# Patient Record
Sex: FEMALE | Race: WHITE | Employment: UNEMPLOYED | URBAN - METROPOLITAN AREA
[De-identification: names, ages, dates, MRNs, and addresses within clinical notes are randomized per-mention and may not be internally consistent; named-entity substitution may affect disease eponyms.]

---

## 2019-02-11 ENCOUNTER — HOSPITAL ENCOUNTER (EMERGENCY)
Age: 44
Discharge: HOME | End: 2019-02-11
Attending: EMERGENCY MEDICINE
Payer: COMMERCIAL

## 2019-02-11 VITALS
OXYGEN SATURATION: 99 % | WEIGHT: 132.5 LBS | HEART RATE: 96 BPM | DIASTOLIC BLOOD PRESSURE: 87 MMHG | HEIGHT: 63 IN | RESPIRATION RATE: 14 BRPM | TEMPERATURE: 97.8 F | SYSTOLIC BLOOD PRESSURE: 157 MMHG | BODY MASS INDEX: 23.48 KG/M2

## 2019-02-11 DIAGNOSIS — D64.9 ANEMIA, UNSPECIFIED TYPE: ICD-10-CM

## 2019-02-11 DIAGNOSIS — N93.8 DUB (DYSFUNCTIONAL UTERINE BLEEDING): Primary | ICD-10-CM

## 2019-02-11 LAB
ALBUMIN SERPL-MCNC: 3.9 G/DL (ref 3.5–5)
ALBUMIN/GLOB SERPL: 1.1 {RATIO} (ref 1.1–2.2)
ALP SERPL-CCNC: 60 U/L (ref 45–117)
ALT SERPL-CCNC: 26 U/L (ref 12–78)
ANION GAP SERPL CALC-SCNC: 6 MMOL/L (ref 5–15)
APPEARANCE UR: CLEAR
AST SERPL-CCNC: 22 U/L (ref 15–37)
BACTERIA URNS QL MICRO: NEGATIVE /HPF
BILIRUB SERPL-MCNC: 0.3 MG/DL (ref 0.2–1)
BILIRUB UR QL: NEGATIVE
BUN SERPL-MCNC: 14 MG/DL (ref 6–20)
BUN/CREAT SERPL: 19 (ref 12–20)
CALCIUM SERPL-MCNC: 8.8 MG/DL (ref 8.5–10.1)
CHLORIDE SERPL-SCNC: 102 MMOL/L (ref 97–108)
CO2 SERPL-SCNC: 25 MMOL/L (ref 21–32)
COLOR UR: ABNORMAL
CREAT SERPL-MCNC: 0.72 MG/DL (ref 0.55–1.02)
EPITH CASTS URNS QL MICRO: ABNORMAL /LPF
ERYTHROCYTE [DISTWIDTH] IN BLOOD BY AUTOMATED COUNT: 13.7 % (ref 11.5–14.5)
GLOBULIN SER CALC-MCNC: 3.6 G/DL (ref 2–4)
GLUCOSE SERPL-MCNC: 110 MG/DL (ref 65–100)
GLUCOSE UR STRIP.AUTO-MCNC: NEGATIVE MG/DL
HCG SERPL QL: NEGATIVE
HCT VFR BLD AUTO: 32.4 % (ref 35–47)
HGB BLD-MCNC: 10.8 G/DL (ref 11.5–16)
HGB UR QL STRIP: ABNORMAL
HYALINE CASTS URNS QL MICRO: ABNORMAL /LPF (ref 0–5)
KETONES UR QL STRIP.AUTO: NEGATIVE MG/DL
LEUKOCYTE ESTERASE UR QL STRIP.AUTO: NEGATIVE
MCH RBC QN AUTO: 29.8 PG (ref 26–34)
MCHC RBC AUTO-ENTMCNC: 33.3 G/DL (ref 30–36.5)
MCV RBC AUTO: 89.3 FL (ref 80–99)
NITRITE UR QL STRIP.AUTO: NEGATIVE
NRBC # BLD: 0 K/UL (ref 0–0.01)
NRBC BLD-RTO: 0 PER 100 WBC
PH UR STRIP: 8 [PH] (ref 5–8)
PLATELET # BLD AUTO: 306 K/UL (ref 150–400)
PMV BLD AUTO: 9.7 FL (ref 8.9–12.9)
POTASSIUM SERPL-SCNC: 3.7 MMOL/L (ref 3.5–5.1)
PROT SERPL-MCNC: 7.5 G/DL (ref 6.4–8.2)
PROT UR STRIP-MCNC: NEGATIVE MG/DL
RBC # BLD AUTO: 3.63 M/UL (ref 3.8–5.2)
RBC #/AREA URNS HPF: >100 /HPF (ref 0–5)
SODIUM SERPL-SCNC: 133 MMOL/L (ref 136–145)
SP GR UR REFRACTOMETRY: 1.01 (ref 1–1.03)
TSH SERPL DL<=0.05 MIU/L-ACNC: 0.37 UIU/ML (ref 0.36–3.74)
UROBILINOGEN UR QL STRIP.AUTO: 0.2 EU/DL (ref 0.2–1)
WBC # BLD AUTO: 8.7 K/UL (ref 3.6–11)
WBC URNS QL MICRO: ABNORMAL /HPF (ref 0–4)

## 2019-02-11 PROCEDURE — 81001 URINALYSIS AUTO W/SCOPE: CPT

## 2019-02-11 PROCEDURE — 36415 COLL VENOUS BLD VENIPUNCTURE: CPT

## 2019-02-11 PROCEDURE — 74011250636 HC RX REV CODE- 250/636: Performed by: EMERGENCY MEDICINE

## 2019-02-11 PROCEDURE — 85027 COMPLETE CBC AUTOMATED: CPT

## 2019-02-11 PROCEDURE — 99282 EMERGENCY DEPT VISIT SF MDM: CPT

## 2019-02-11 PROCEDURE — 84703 CHORIONIC GONADOTROPIN ASSAY: CPT

## 2019-02-11 PROCEDURE — 96360 HYDRATION IV INFUSION INIT: CPT

## 2019-02-11 PROCEDURE — 84443 ASSAY THYROID STIM HORMONE: CPT

## 2019-02-11 PROCEDURE — 80053 COMPREHEN METABOLIC PANEL: CPT

## 2019-02-11 RX ORDER — MEDROXYPROGESTERONE ACETATE 10 MG/1
10 TABLET ORAL DAILY
Qty: 5 TAB | Refills: 0 | Status: SHIPPED | OUTPATIENT
Start: 2019-02-11 | End: 2019-02-16

## 2019-02-11 RX ORDER — LIOTHYRONINE SODIUM 5 UG/1
5 TABLET ORAL DAILY
COMMUNITY
End: 2019-03-20

## 2019-02-11 RX ADMIN — SODIUM CHLORIDE 1000 ML: 900 INJECTION, SOLUTION INTRAVENOUS at 20:39

## 2019-02-12 NOTE — ED PROVIDER NOTES
EMERGENCY DEPARTMENT HISTORY AND PHYSICAL EXAM 
 
 
Date: 2/11/2019 Patient Name: Louise Hoover History of Presenting Illness Chief Complaint Patient presents with  Vaginal Bleeding Pt reports vaginal bleeding x 9 days. Pt states her periods have been irregular since November History Provided By: Patient and Patient's  HPI: Louise Hoover, 37 y.o. female with PMHx significant for thyroid disease, presents ambulatory to the ED with cc of continued vaginal bleeding x 9 days. She reports associated symptoms of generalized weakness, lightheadedness, fatigue, nausea, and abdominal distension. Pt states she has been experiencing irregular menstrual cycles, noting her menses in October was the last regular cycle. She had not had a menses in November, but in December, she states her menses was regular then with the exception that it had lasted a couple days longer. About 2 weeks ago, she states her menses was late and had then been spotting until February 2nd, which it had became heavy. Since then, her menses had continued and notes she had been experienced \"lots of clots. \" She informs of prior issues with vaginal bleeding. When asked about any abdominal pain, the pt states she had experienced intermittent L sided abdominal pain, but notes this is of no concern to her currently. She denies any prior major surgeries. Pt denies any vomiting. There are no other complaints, changes, or physical findings at this time. PCP: Kalee Marshall MD 
 
No current facility-administered medications on file prior to encounter. Current Outpatient Medications on File Prior to Encounter Medication Sig Dispense Refill  liothyronine (CYTOMEL) 5 mcg tablet Take 5 mcg by mouth daily.  amitriptyline (ELAVIL) 50 mg tablet TAKE 1 TABLET NIGHTLY 90 Tab 2  
 levothyroxine (SYNTHROID) 50 mcg tablet Take 1 Tab by mouth Daily (before breakfast). 90 Tab 4  clonazePAM (KLONOPIN) 1 mg tablet Take 0.25 mg by mouth as needed.  multivitamins-minerals-lutein (CENTRUM SILVER) Tab Take  by mouth. Past History Past Medical History: 
Past Medical History:  
Diagnosis Date  Thyroid condition Past Surgical History: 
Past Surgical History:  
Procedure Laterality Date  HX CYST REMOVAL Family History: 
Family History Problem Relation Age of Onset  Heart Disease Mother  Hypertension Mother  Hypertension Father Social History: 
Social History Tobacco Use  Smoking status: Never Smoker  Smokeless tobacco: Never Used Substance Use Topics  Alcohol use: No  
 Drug use: No  
 
 
Allergies: Allergies Allergen Reactions  Pcn [Penicillins] Hives Review of Systems Review of Systems Constitutional: Positive for fatigue. Negative for chills and fever. HENT: Negative for congestion, rhinorrhea, sneezing and sore throat. Respiratory: Negative for shortness of breath. Cardiovascular: Negative for chest pain. Gastrointestinal: Positive for abdominal distention, abdominal pain and nausea. Negative for vomiting. Musculoskeletal: Negative for back pain, myalgias and neck stiffness. Skin: Negative for rash. Neurological: Positive for weakness (+generalized) and light-headedness. Negative for dizziness and headaches. All other systems reviewed and are negative. Physical Exam  
Physical Exam  
Constitutional: She is oriented to person, place, and time. She appears well-developed and well-nourished. No distress. HENT:  
Head: Normocephalic. Mouth/Throat: Oropharynx is clear and moist.  
Eyes: EOM are normal.  
Neck: Normal range of motion. Neck supple. Cardiovascular: Normal rate, regular rhythm, normal heart sounds and intact distal pulses. Pulmonary/Chest: Effort normal and breath sounds normal. No respiratory distress. Abdominal: Soft. Bowel sounds are normal. There is tenderness. There is no rebound. Mild lower abdominal tenderness Musculoskeletal: Normal range of motion. She exhibits no edema. Neurological: She is alert and oriented to person, place, and time. Skin: Skin is warm and dry. Psychiatric: She has a normal mood and affect. Diagnostic Study Results Labs - Recent Results (from the past 12 hour(s)) URINALYSIS W/ RFLX MICROSCOPIC Collection Time: 02/11/19  6:30 PM  
Result Value Ref Range Color YELLOW/STRAW Appearance CLEAR CLEAR Specific gravity 1.011 1.003 - 1.030    
 pH (UA) 8.0 5.0 - 8.0 Protein NEGATIVE  NEG mg/dL Glucose NEGATIVE  NEG mg/dL Ketone NEGATIVE  NEG mg/dL Bilirubin NEGATIVE  NEG Blood LARGE (A) NEG Urobilinogen 0.2 0.2 - 1.0 EU/dL Nitrites NEGATIVE  NEG Leukocyte Esterase NEGATIVE  NEG    
 WBC 0-4 0 - 4 /hpf  
 RBC >100 (H) 0 - 5 /hpf Epithelial cells FEW FEW /lpf Bacteria NEGATIVE  NEG /hpf Hyaline cast 0-2 0 - 5 /lpf  
CBC W/O DIFF Collection Time: 02/11/19  6:33 PM  
Result Value Ref Range WBC 8.7 3.6 - 11.0 K/uL  
 RBC 3.63 (L) 3.80 - 5.20 M/uL  
 HGB 10.8 (L) 11.5 - 16.0 g/dL HCT 32.4 (L) 35.0 - 47.0 % MCV 89.3 80.0 - 99.0 FL  
 MCH 29.8 26.0 - 34.0 PG  
 MCHC 33.3 30.0 - 36.5 g/dL  
 RDW 13.7 11.5 - 14.5 % PLATELET 922 878 - 529 K/uL MPV 9.7 8.9 - 12.9 FL  
 NRBC 0.0 0  WBC ABSOLUTE NRBC 0.00 0.00 - 0.01 K/uL METABOLIC PANEL, COMPREHENSIVE Collection Time: 02/11/19  6:33 PM  
Result Value Ref Range Sodium 133 (L) 136 - 145 mmol/L Potassium 3.7 3.5 - 5.1 mmol/L Chloride 102 97 - 108 mmol/L  
 CO2 25 21 - 32 mmol/L Anion gap 6 5 - 15 mmol/L Glucose 110 (H) 65 - 100 mg/dL BUN 14 6 - 20 MG/DL Creatinine 0.72 0.55 - 1.02 MG/DL  
 BUN/Creatinine ratio 19 12 - 20 GFR est AA >60 >60 ml/min/1.73m2 GFR est non-AA >60 >60 ml/min/1.73m2  Calcium 8.8 8.5 - 10.1 MG/DL  
 Bilirubin, total 0.3 0.2 - 1.0 MG/DL  
 ALT (SGPT) 26 12 - 78 U/L  
 AST (SGOT) 22 15 - 37 U/L Alk. phosphatase 60 45 - 117 U/L Protein, total 7.5 6.4 - 8.2 g/dL Albumin 3.9 3.5 - 5.0 g/dL Globulin 3.6 2.0 - 4.0 g/dL A-G Ratio 1.1 1.1 - 2.2 HCG QL SERUM Collection Time: 02/11/19  6:33 PM  
Result Value Ref Range HCG, Ql. NEGATIVE  NEG    
TSH 3RD GENERATION Collection Time: 02/11/19  6:33 PM  
Result Value Ref Range TSH 0.37 0.36 - 3.74 uIU/mL Radiologic Studies - No orders to display Medical Decision Making I am the first provider for this patient. I reviewed the vital signs, available nursing notes, past medical history, past surgical history, family history and social history. Vital Signs-Reviewed the patient's vital signs. Patient Vitals for the past 12 hrs: 
 Temp Pulse Resp BP SpO2  
02/11/19 1813 97.8 °F (36.6 °C) 96 14 157/87 99 % Records Reviewed: Nursing Notes, Old Medical Records, Previous Radiology Studies and Previous Laboratory Studies Provider Notes (Medical Decision Making): DDx: hypothyroidism, symptomatic anemia, dysfunctional uterine bleeding, UTI, thyroid dysfunction ED Course:  
Initial assessment performed. The patients presenting problems have been discussed, and they are in agreement with the care plan formulated and outlined with them. I have encouraged them to ask questions as they arise throughout their visit. PROGRESS NOTE: 
9:35 PM 
Pt has been re-evaluated. Will start the pt on iron supplementation. Will have the pt start Provera and have her f/u with OB/GYN. Critical Care Time:  
0 Disposition: 
DISCHARGE NOTE 
9:37 PM 
The patient has been re-evaluated and is ready for discharge. Reviewed available results with patient. Counseled patient on diagnosis and care plan. Patient has expressed understanding, and all questions have been answered.  Patient agrees with plan and agrees to follow up as recommended, or return to the ED if their symptoms worsen. Discharge instructions have been provided and explained to the patient, along with reasons to return to the ED. PLAN: 
1. Discharge Current Discharge Medication List  
  
START taking these medications Details  
medroxyPROGESTERone (PROVERA) 10 mg tablet Take 1 Tab by mouth daily for 5 days. Qty: 5 Tab, Refills: 0  
  
  
 
2. Follow-up Information Follow up With Specialties Details Why Contact Info Danay Goldstein MD Obstetrics & Gynecology, Gynecology, Obstetrics Schedule an appointment as soon as possible for a visit  [facility] Lesly England [address] [place] [id number] [phone] Return to ED if worse Diagnosis Clinical Impression: 1. DUB (dysfunctional uterine bleeding) 2. Anemia, unspecified type Attestations: This note is prepared by Keshia Mora, acting as Scribe for Bradley Jasso. MD Raine. Bradley Jasso. MD Raine: The scribe's documentation has been prepared under my direction and personally reviewed by me in its entirety. I confirm that the note above accurately reflects all work, treatment, procedures, and medical decision making performed by me. This note will not be viewable in [address]

## 2019-02-12 NOTE — ED NOTES
8:39 PM 
Patient presents with complaints of feeling weak and heavy vaginal bleeding. Patient states that she is going through 1 pad every 4 hours. Patient is also complaining of abdominal distention.

## 2019-02-12 NOTE — DISCHARGE INSTRUCTIONS
Patient Education        Abnormal Uterine Bleeding: Care Instructions  Your Care Instructions    Abnormal uterine bleeding (AUB) is irregular bleeding from the uterus that is longer or heavier than usual or does not occur at your regular time. Sometimes it is caused by changes in hormone levels. It can also be caused by growths in the uterus, such as fibroids or polyps. Sometimes a cause cannot be found. You may have heavy bleeding when you are not expecting your period. Your doctor may suggest a pregnancy test, if you think you are pregnant. Follow-up care is a key part of your treatment and safety. Be sure to make and go to all appointments, and call your doctor if you are having problems. It's also a good idea to know your test results and keep a list of the medicines you take. How can you care for yourself at home? · Be safe with medicines. Take pain medicines exactly as directed. ? If the doctor gave you a prescription medicine for pain, take it as prescribed. ? If you are not taking a prescription pain medicine, ask your doctor if you can take an over-the-counter medicine. · You may be low in iron because of blood loss. Eat a balanced diet that is high in iron and vitamin C. Foods rich in iron include red meat, shellfish, eggs, beans, and leafy green vegetables. Talk to your doctor about whether you need to take iron pills or a multivitamin. When should you call for help? Call 911 anytime you think you may need emergency care. For example, call if:    · You passed out (lost consciousness).    Call your doctor now or seek immediate medical care if:    · You have new or worse belly or pelvic pain.     · You have severe vaginal bleeding.     · You feel dizzy or lightheaded, or you feel like you may faint.    Watch closely for changes in your health, and be sure to contact your doctor if:    · You think you may be pregnant.     · Your bleeding gets worse.     · You do not get better as expected.    Where can you learn more? Go to http://lupis-roderick.info/. Enter E728 in the search box to learn more about \"Abnormal Uterine Bleeding: Care Instructions. \"  Current as of: May 14, 2018  Content Version: 11.9  © 0946-4289 [employer]. Care instructions adapted under license by [employer] (which disclaims liability or warranty for this information). If you have questions about a medical condition or this instruction, always ask your healthcare professional. [facility] any warranty or liability for your use of this information. Patient Education        Anemia: Care Instructions  Your Care Instructions    Anemia is a low level of red blood cells, which carry oxygen throughout your body. Many things can cause anemia. Lack of iron is one of the most common causes. Your body needs iron to make hemoglobin, a substance in red blood cells that carries oxygen from the lungs to your body's cells. Without enough iron, the body produces fewer and smaller red blood cells. As a result, your body's cells do not get enough oxygen, and you feel tired and weak. And you may have trouble concentrating. Bleeding is the most common cause of a lack of iron. You may have heavy menstrual bleeding or bleeding caused by conditions such as ulcers, hemorrhoids, or cancer. Regular use of aspirin or other anti-inflammatory medicines (such as ibuprofen) also can cause bleeding in some people. A lack of iron in your diet also can cause anemia, especially at times when the body needs more iron, such as during pregnancy, infancy, and the teen years. Your doctor may have prescribed iron pills. It may take several months of treatment for your iron levels to return to normal. Your doctor also may suggest that you eat foods that are rich in iron, such as meat and beans. There are many other causes of anemia. It is not always due to a lack of iron.  Finding the specific cause of your anemia will help your doctor find the right treatment for you. Follow-up care is a key part of your treatment and safety. Be sure to make and go to all appointments, and call your doctor if you are having problems. It's also a good idea to know your test results and keep a list of the medicines you take. How can you care for yourself at home? · Take your medicines exactly as prescribed. Call your doctor if you think you are having a problem with your medicine. · If your doctor recommends iron pills, take them as directed:  ? Try to take the pills on an empty stomach about 1 hour before or 2 hours after meals. But you may need to take iron with food to avoid an upset stomach. ? Do not take antacids or drink milk or caffeine drinks (such as coffee, tea, or cola) at the same time or within 2 hours of the time that you take your iron. They can make it hard for your body to absorb the iron. ? Vitamin C (from food or supplements) helps your body absorb iron. Try taking iron pills with a glass of orange juice or some other food that is high in vitamin C, such as citrus fruits. ? Iron pills may cause stomach problems, such as heartburn, nausea, diarrhea, constipation, and cramps. Be sure to drink plenty of fluids, and include fruits, vegetables, and fiber in your diet each day. Iron pills often make your bowel movements dark or green. ? If you forget to take an iron pill, do not take a double dose of iron the next time you take a pill. ? Keep iron pills out of the reach of small children. An overdose of iron can be very dangerous. · Follow your doctor's advice about eating iron-rich foods. These include red meat, shellfish, poultry, eggs, beans, raisins, whole-grain bread, and leafy green vegetables. · Steam vegetables to help them keep their iron content. When should you call for help? Call 911 anytime you think you may need emergency care. For example, call if:    · You have symptoms of a heart attack.  These may include:  ? Chest pain or pressure, or a strange feeling in the chest.  ? Sweating. ? Shortness of breath. ? Nausea or vomiting. ? Pain, pressure, or a strange feeling in the back, neck, jaw, or upper belly or in one or both shoulders or arms. ? Lightheadedness or sudden weakness. ? A fast or irregular heartbeat. After you call 911, the  may tell you to chew 1 adult-strength or 2 to 4 low-dose aspirin. Wait for an ambulance. Do not try to drive yourself.     · You passed out (lost consciousness).    Call your doctor now or seek immediate medical care if:    · You have new or increased shortness of breath.     · You are dizzy or lightheaded, or you feel like you may faint.     · Your fatigue and weakness continue or get worse.     · You have any abnormal bleeding, such as:  ? Nosebleeds. ? Vaginal bleeding that is different (heavier, more frequent, at a different time of the month) than what you are used to.  ? Bloody or black stools, or rectal bleeding. ? Bloody or pink urine.    Watch closely for changes in your health, and be sure to contact your doctor if:    · You do not get better as expected. Where can you learn more? Go to http://lupis-roderick.info/. Enter R301 in the search box to learn more about \"Anemia: Care Instructions. \"  Current as of: May 6, 2018  Content Version: 11.9  © 4872-8522 [employer]. Care instructions adapted under license by [employer] (which disclaims liability or warranty for this information). If you have questions about a medical condition or this instruction, always ask your healthcare professional. [place] [address] any warranty or liability for your use of this information.

## 2019-02-20 ENCOUNTER — HOSPITAL ENCOUNTER (EMERGENCY)
Age: 44
Discharge: HOME | End: 2019-02-20
Attending: EMERGENCY MEDICINE
Payer: COMMERCIAL

## 2019-02-20 VITALS
OXYGEN SATURATION: 100 % | WEIGHT: 134.04 LBS | TEMPERATURE: 97.9 F | HEART RATE: 96 BPM | DIASTOLIC BLOOD PRESSURE: 105 MMHG | HEIGHT: 63 IN | SYSTOLIC BLOOD PRESSURE: 156 MMHG | RESPIRATION RATE: 18 BRPM | BODY MASS INDEX: 23.75 KG/M2

## 2019-02-20 DIAGNOSIS — N92.1 MENORRHAGIA WITH IRREGULAR CYCLE: Primary | ICD-10-CM

## 2019-02-20 LAB
ABO + RH BLD: NORMAL
ALBUMIN SERPL-MCNC: 3.4 G/DL (ref 3.5–5)
ALBUMIN/GLOB SERPL: 1 {RATIO} (ref 1.1–2.2)
ALP SERPL-CCNC: 49 U/L (ref 45–117)
ALT SERPL-CCNC: 22 U/L (ref 12–78)
ANION GAP SERPL CALC-SCNC: 8 MMOL/L (ref 5–15)
AST SERPL-CCNC: 32 U/L (ref 15–37)
BASOPHILS # BLD: 0.1 K/UL (ref 0–0.1)
BASOPHILS NFR BLD: 1 % (ref 0–1)
BILIRUB SERPL-MCNC: 0.3 MG/DL (ref 0.2–1)
BLOOD GROUP ANTIBODIES SERPL: NORMAL
BUN SERPL-MCNC: 10 MG/DL (ref 6–20)
BUN/CREAT SERPL: 14 (ref 12–20)
CALCIUM SERPL-MCNC: 8.2 MG/DL (ref 8.5–10.1)
CHLORIDE SERPL-SCNC: 105 MMOL/L (ref 97–108)
CO2 SERPL-SCNC: 23 MMOL/L (ref 21–32)
COMMENT, HOLDF: NORMAL
CREAT SERPL-MCNC: 0.74 MG/DL (ref 0.55–1.02)
DIFFERENTIAL METHOD BLD: ABNORMAL
EOSINOPHIL # BLD: 0.1 K/UL (ref 0–0.4)
EOSINOPHIL NFR BLD: 1 % (ref 0–7)
ERYTHROCYTE [DISTWIDTH] IN BLOOD BY AUTOMATED COUNT: 15 % (ref 11.5–14.5)
GLOBULIN SER CALC-MCNC: 3.4 G/DL (ref 2–4)
GLUCOSE SERPL-MCNC: 90 MG/DL (ref 65–100)
HCT VFR BLD AUTO: 30.9 % (ref 35–47)
HGB BLD-MCNC: 10.3 G/DL (ref 11.5–16)
IMM GRANULOCYTES # BLD AUTO: 0 K/UL (ref 0–0.04)
IMM GRANULOCYTES NFR BLD AUTO: 0 % (ref 0–0.5)
LYMPHOCYTES # BLD: 1.5 K/UL (ref 0.8–3.5)
LYMPHOCYTES NFR BLD: 15 % (ref 12–49)
MCH RBC QN AUTO: 30.8 PG (ref 26–34)
MCHC RBC AUTO-ENTMCNC: 33.3 G/DL (ref 30–36.5)
MCV RBC AUTO: 92.5 FL (ref 80–99)
MONOCYTES # BLD: 0.5 K/UL (ref 0–1)
MONOCYTES NFR BLD: 5 % (ref 5–13)
NEUTS SEG # BLD: 8 K/UL (ref 1.8–8)
NEUTS SEG NFR BLD: 78 % (ref 32–75)
NRBC # BLD: 0 K/UL (ref 0–0.01)
NRBC BLD-RTO: 0 PER 100 WBC
PLATELET # BLD AUTO: 404 K/UL (ref 150–400)
PMV BLD AUTO: 10 FL (ref 8.9–12.9)
POTASSIUM SERPL-SCNC: 4.3 MMOL/L (ref 3.5–5.1)
PROT SERPL-MCNC: 6.8 G/DL (ref 6.4–8.2)
RBC # BLD AUTO: 3.34 M/UL (ref 3.8–5.2)
SAMPLES BEING HELD,HOLD: NORMAL
SODIUM SERPL-SCNC: 136 MMOL/L (ref 136–145)
SPECIMEN EXP DATE BLD: NORMAL
WBC # BLD AUTO: 10.2 K/UL (ref 3.6–11)

## 2019-02-20 PROCEDURE — 85025 COMPLETE CBC W/AUTO DIFF WBC: CPT

## 2019-02-20 PROCEDURE — 84480 ASSAY TRIIODOTHYRONINE (T3): CPT

## 2019-02-20 PROCEDURE — 74011250636 HC RX REV CODE- 250/636: Performed by: EMERGENCY MEDICINE

## 2019-02-20 PROCEDURE — 36415 COLL VENOUS BLD VENIPUNCTURE: CPT

## 2019-02-20 PROCEDURE — 84481 FREE ASSAY (FT-3): CPT

## 2019-02-20 PROCEDURE — 86900 BLOOD TYPING SEROLOGIC ABO: CPT

## 2019-02-20 PROCEDURE — 96361 HYDRATE IV INFUSION ADD-ON: CPT

## 2019-02-20 PROCEDURE — 80053 COMPREHEN METABOLIC PANEL: CPT

## 2019-02-20 PROCEDURE — 99282 EMERGENCY DEPT VISIT SF MDM: CPT

## 2019-02-20 PROCEDURE — 96360 HYDRATION IV INFUSION INIT: CPT

## 2019-02-20 RX ORDER — NORETHINDRONE ACETATE AND ETHINYL ESTRADIOL .03; 1.5 MG/1; MG/1
TABLET ORAL
Qty: 1 PACKAGE | Refills: 0 | Status: SHIPPED | OUTPATIENT
Start: 2019-02-20 | End: 2019-03-26

## 2019-02-20 RX ADMIN — SODIUM CHLORIDE 1000 ML: 900 INJECTION, SOLUTION INTRAVENOUS at 10:21

## 2019-02-20 NOTE — ED PROVIDER NOTES
EMERGENCY DEPARTMENT HISTORY AND PHYSICAL EXAM 
 
 
Date: 2/20/2019 Patient Name: Krystina Polo History of Presenting Illness Chief Complaint Patient presents with  Vaginal Bleeding  
  refer to chart, sent from [facility] for the continued vaginal bleeding. denies pregnancy PROVIDER IN TRIAGE NOTE: 
10:01 AM 
Alexandro Miles PA-C has evaluated the patient as the Provider in Triage (PIT) / provider in [facility] for heavy, abnormal vaginal bleeding, sent by OB/GYN. The vital signs and the triage nurse assessment have been reviewed. The patient and any available family have been advised that the appropriate studies have been ordered to initiate the work up based on the clinical presentation during the assessment. The pt has been advised that they will be accommodated in Emergency Department as soon as possible. The pt has been requested to contact the triage nurse or PIT/JET immediately if they experiences any changes in their condition during this brief waiting period. History Provided By: Patient HPI: Krystina Polo, 37 y.o. female with PMHx significant for thyroid disease, presents via wheelchair by referral from [facility]'s to the ED for further evaluation of persistent vaginal bleeding ongoing since 2/2 however, exacerbated over the last 2 days. Pt reports associated sx of pallor and lightheadedness as well. She expresses she was seen in the ED at onset of sx and was given Provera for her sx endorsing she found mild relief in her sx. Pt discloses over the last 2 days her bleeding has worsened with no exacerbating factors and unchanged with Provera leading her to her OB-GYN's office for evaluation. After pelvic and US negative for fibroids she was referred back to the ED for further evaluation. She denies any fevers, chills, chest pain, SOB, abdominal pain, nausea, vomiting, diarrhea, or dysuria. There are no other complaints, changes, or physical findings at this time. PCP: Rajendra Adams MD 
SHx: (-)Tobacco; (-) ETOH; (-) Illicit drug use Allergies: penicillins No current facility-administered medications on file prior to encounter. Current Outpatient Medications on File Prior to Encounter Medication Sig Dispense Refill  liothyronine (CYTOMEL) 5 mcg tablet Take 5 mcg by mouth daily.  amitriptyline (ELAVIL) 50 mg tablet TAKE 1 TABLET NIGHTLY 90 Tab 2  
 levothyroxine (SYNTHROID) 50 mcg tablet Take 1 Tab by mouth Daily (before breakfast). 90 Tab 4  clonazePAM (KLONOPIN) 1 mg tablet Take 0.25 mg by mouth as needed.  multivitamins-minerals-lutein (CENTRUM SILVER) Tab Take  by mouth. Past History Past Medical History: 
Past Medical History:  
Diagnosis Date  Thyroid condition Past Surgical History: 
Past Surgical History:  
Procedure Laterality Date  HX CYST REMOVAL Family History: 
Family History Problem Relation Age of Onset  Heart Disease Mother  Hypertension Mother  Hypertension Father Social History: 
Social History Tobacco Use  Smoking status: Never Smoker  Smokeless tobacco: Never Used Substance Use Topics  Alcohol use: No  
 Drug use: No  
 
 
Allergies: Allergies Allergen Reactions  Pcn [Penicillins] Hives Review of Systems Review of Systems Constitutional: Negative for activity change, appetite change, chills, fever and unexpected weight change. HENT: Negative for congestion. Eyes: Negative for pain and visual disturbance. Respiratory: Negative for cough and shortness of breath. Cardiovascular: Negative for chest pain. Gastrointestinal: Negative for abdominal pain, diarrhea, nausea and vomiting. Genitourinary: Positive for vaginal bleeding. Negative for dysuria. Musculoskeletal: Negative for back pain. Skin: Positive for pallor. Negative for rash. Neurological: Positive for light-headedness. Negative for headaches. Physical Exam  
Physical Exam  
Constitutional: She is oriented to person, place, and time. She appears well-developed and well-nourished. Pale appearing middle aged female in mild distress, appears anxious. HENT:  
Head: Normocephalic and atraumatic. Mouth/Throat: Oropharynx is clear and moist. Mucous membranes are dry (mild ). Eyes: Conjunctivae and EOM are normal. Pupils are equal, round, and reactive to light. Right eye exhibits no discharge. Left eye exhibits no discharge. Neck: Normal range of motion. Neck supple. Cardiovascular: Normal rate, regular rhythm and normal heart sounds. No murmur heard. Borderline tachycardic Pulmonary/Chest: Effort normal and breath sounds normal. No respiratory distress. She has no wheezes. She has no rales. Abdominal: Soft. Bowel sounds are normal. She exhibits no distension. There is no tenderness. Musculoskeletal: Normal range of motion. She exhibits no edema. Neurological: She is alert and oriented to person, place, and time. No cranial nerve deficit. She exhibits normal muscle tone. Skin: Skin is warm and dry. No rash noted. She is not diaphoretic. There is pallor. Nursing note and vitals reviewed. Diagnostic Study Results Labs - Recent Results (from the past 12 hour(s)) CBC WITH AUTOMATED DIFF Collection Time: 02/20/19 10:13 AM  
Result Value Ref Range WBC 10.2 3.6 - 11.0 K/uL  
 RBC 3.34 (L) 3.80 - 5.20 M/uL  
 HGB 10.3 (L) 11.5 - 16.0 g/dL HCT 30.9 (L) 35.0 - 47.0 % MCV 92.5 80.0 - 99.0 FL  
 MCH 30.8 26.0 - 34.0 PG  
 MCHC 33.3 30.0 - 36.5 g/dL  
 RDW 15.0 (H) 11.5 - 14.5 % PLATELET 762 (H) 990 - 400 K/uL MPV 10.0 8.9 - 12.9 FL  
 NRBC 0.0 0  WBC ABSOLUTE NRBC 0.00 0.00 - 0.01 K/uL NEUTROPHILS 78 (H) 32 - 75 % LYMPHOCYTES 15 12 - 49 % MONOCYTES 5 5 - 13 % EOSINOPHILS 1 0 - 7 % BASOPHILS 1 0 - 1 % IMMATURE GRANULOCYTES 0 0.0 - 0.5 % ABS. NEUTROPHILS 8.0 1.8 - 8.0 K/UL  
 ABS. LYMPHOCYTES 1.5 0.8 - 3.5 K/UL  
 ABS. MONOCYTES 0.5 0.0 - 1.0 K/UL  
 ABS. EOSINOPHILS 0.1 0.0 - 0.4 K/UL  
 ABS. BASOPHILS 0.1 0.0 - 0.1 K/UL  
 ABS. IMM. GRANS. 0.0 0.00 - 0.04 K/UL  
 DF AUTOMATED    
SAMPLES BEING HELD Collection Time: 02/20/19 10:49 AM  
Result Value Ref Range [address] COMMENT Add-on orders for these samples will be processed based on acceptable specimen integrity and analyte stability, which may vary by analyte. METABOLIC PANEL, COMPREHENSIVE Collection Time: 02/20/19 10:49 AM  
Result Value Ref Range Sodium 136 136 - 145 mmol/L Potassium 4.3 3.5 - 5.1 mmol/L Chloride 105 97 - 108 mmol/L  
 CO2 23 21 - 32 mmol/L Anion gap 8 5 - 15 mmol/L Glucose 90 65 - 100 mg/dL BUN 10 6 - 20 MG/DL Creatinine 0.74 0.55 - 1.02 MG/DL  
 BUN/Creatinine ratio 14 12 - 20 GFR est AA >60 >60 ml/min/1.73m2 GFR est non-AA >60 >60 ml/min/1.73m2 Calcium 8.2 (L) 8.5 - 10.1 MG/DL Bilirubin, total 0.3 0.2 - 1.0 MG/DL  
 ALT (SGPT) 22 12 - 78 U/L  
 AST (SGOT) 32 15 - 37 U/L Alk. phosphatase 49 45 - 117 U/L Protein, total 6.8 6.4 - 8.2 g/dL Albumin 3.4 (L) 3.5 - 5.0 g/dL Globulin 3.4 2.0 - 4.0 g/dL A-G Ratio 1.0 (L) 1.1 - 2.2 Medical Decision Making I am the first provider for this patient. I reviewed the vital signs, available nursing notes, past medical history, past surgical history, family history and social history. Vital Signs-Reviewed the patient's vital signs. Patient Vitals for the past 12 hrs: 
 Temp Pulse Resp BP SpO2  
02/20/19 0951 97.9 °F (36.6 °C) 96 18 (!) 156/105 100 % Pulse Oximetry Analysis - 100% on room air Cardiac Monitor:  
Rate: 96 bpm 
Rhythm: Normal Sinus Rhythm Records Reviewed: Nursing Notes, Old Medical Records, Previous Radiology Studies and Previous Laboratory Studies Provider Notes (Medical Decision Making): Menorrhagia with acute worsening referred over for OB evaluation. ED Course:  
Initial assessment performed. The patients presenting problems have been discussed, and they are in agreement with the care plan formulated and outlined with them. I have encouraged them to ask questions as they arise throughout their visit. Progress Notes: 
 
10:50 AM  
Jh Drake MD has spoken with Dr. Lázaro Mixon who will evaluate the pt in the ED. 12:02 PM 
Dr. Lázaro Mixon has evaluated the pt in the ED. Pt was advised given labs and reassuring US / pelvic findings she is stable to go home with an adult diaper. Pt is agreeable to plan. 13:35 Notified by staff that patient is calling as her prescription is not ready at the pharmacy. I discussed recommendations from OB. RX printed and left in triage. Critical Care Time: 0 minutes Disposition: 
Discharge Note: 
12:03 PM 
The patient is ready for discharge. The patient's signs, symptoms, diagnosis, and discharge instruction have been discussed and the patient has conveyed their understanding. The patient is to follow up as recommended or return to the ER should their symptoms worsen. Plan has been discussed and the patient is in agreement. Written by Mariella Bennett ED Scribe, as dictated by Jh Drake MD 
 
PLAN: 
1. Current Discharge Medication List  
  
 
2. Follow-up Information Follow up With Specialties Details Why Contact Info [facility] EMERGENCY DEPT Emergency Medicine  If symptoms worsen [address] [place] [address] Drive [address] [facility] 
[phone] Return to ED if worse Diagnosis Clinical Impression: 1. Menorrhagia with irregular cycle Attestations: 
 
Attestation: This note is prepared by Lauren Bennett, acting as Scribe for Jh Drake MD. Jh Drake MD: The scribe's documentation has been prepared under my direction and personally reviewed by me in its entirety. I confirm that the note above accurately reflects all work, treatment, procedures, and medical decision making performed by me. This note will not be viewable in [address]

## 2019-02-20 NOTE — DISCHARGE INSTRUCTIONS
Patient Education        Abnormal Uterine Bleeding: Care Instructions  Your Care Instructions    Abnormal uterine bleeding (AUB) is irregular bleeding from the uterus that is longer or heavier than usual or does not occur at your regular time. Sometimes it is caused by changes in hormone levels. It can also be caused by growths in the uterus, such as fibroids or polyps. Sometimes a cause cannot be found. You may have heavy bleeding when you are not expecting your period. Your doctor may suggest a pregnancy test, if you think you are pregnant. Follow-up care is a key part of your treatment and safety. Be sure to make and go to all appointments, and call your doctor if you are having problems. It's also a good idea to know your test results and keep a list of the medicines you take. How can you care for yourself at home? · Be safe with medicines. Take pain medicines exactly as directed. ? If the doctor gave you a prescription medicine for pain, take it as prescribed. ? If you are not taking a prescription pain medicine, ask your doctor if you can take an over-the-counter medicine. · You may be low in iron because of blood loss. Eat a balanced diet that is high in iron and vitamin C. Foods rich in iron include red meat, shellfish, eggs, beans, and leafy green vegetables. Talk to your doctor about whether you need to take iron pills or a multivitamin. When should you call for help? Call 911 anytime you think you may need emergency care. For example, call if:    · You passed out (lost consciousness).    Call your doctor now or seek immediate medical care if:    · You have new or worse belly or pelvic pain.     · You have severe vaginal bleeding.     · You feel dizzy or lightheaded, or you feel like you may faint.    Watch closely for changes in your health, and be sure to contact your doctor if:    · You think you may be pregnant.     · Your bleeding gets worse.     · You do not get better as expected.    Where can you learn more? Go to http://lupis-roderick.info/. Enter T301 in the search box to learn more about \"Abnormal Uterine Bleeding: Care Instructions. \"  Current as of: May 14, 2018  Content Version: 11.9  © 1427-4843 [employer]. Care instructions adapted under license by [employer] (which disclaims liability or warranty for this information). If you have questions about a medical condition or this instruction, always ask your healthcare professional. [place] [address] any warranty or liability for your use of this information.

## 2019-02-20 NOTE — CONSULTS
Gyn Consult    Subjective:     CC: Heavy VB    Negra Goldberg is a 37 y.o.  premenopausal female who is being seen for DUB. She was sent from the office by Dr. Vivi Amor to have CBC checked and consideration of admission for IV premarin. Patient reports spotting since the end of January then heavy on Feb 2 for several days. She saw Dr. Dave Lozoya last week who prescribed Provera. She states the provera slowed things down but did not stop her bleeding and then heavy VB resumed this AM.  She presented to the office as a walk in. Dr. Vivi Amor ordered an [address] that reportedly showed a thickened ES of 30 mm but no other structural abnormality. She also did an office Endometrial Biopsy. She is also hypothyroid and had her meds adjusted last week as well. She is due for repeat labs. OB/GYN ROS: heavy VB as above with some dizziness  OB/GYN history:see office note    There are no active problems to display for this patient. Past Medical History:   Diagnosis Date    Thyroid condition       Past Surgical History:   Procedure Laterality Date    HX CYST REMOVAL        Social History     Tobacco Use    Smoking status: Never Smoker    Smokeless tobacco: Never Used   Substance Use Topics    Alcohol use: No      Family History   Problem Relation Age of Onset    Heart Disease Mother     Hypertension Mother     Hypertension Father       Prior to Admission Medications   Prescriptions Last Dose Informant Patient Reported? Taking?   amitriptyline (ELAVIL) 50 mg tablet   No No   Sig: TAKE 1 TABLET NIGHTLY   clonazePAM (KLONOPIN) 1 mg tablet   Yes No   Sig: Take 0.25 mg by mouth as needed. levothyroxine (SYNTHROID) 50 mcg tablet   No No   Sig: Take 1 Tab by mouth Daily (before breakfast). liothyronine (CYTOMEL) 5 mcg tablet   Yes No   Sig: Take 5 mcg by mouth daily. multivitamins-minerals-lutein (CENTRUM SILVER) Tab   Yes No   Sig: Take  by mouth.         Facility-Administered Medications: None Allergies   Allergen Reactions    Pcn [Penicillins] Hives        Review of Systems:  10 point ROS,  Pertinent items are noted in the History of Present Illness. Objective:     Patient Vitals for the past 8 hrs:   BP Temp Pulse Resp SpO2 Height Weight   19 0951 (!) 156/105 97.9 °F (36.6 °C) 96 18 100 % 5' 3\" (1.6 m) 60.8 kg (134 lb 0.6 oz)     Temp (24hrs), Av.9 °F (36.6 °C), Min:97.9 °F (36.6 °C), Max:97.9 °F (36.6 °C)    No intake/output data recorded. Physical Exam:   No exam performed today, just performed in office. Labs:    Recent Results (from the past 24 hour(s))   CBC WITH AUTOMATED DIFF    Collection Time: 19 10:13 AM   Result Value Ref Range    WBC 10.2 3.6 - 11.0 K/uL    RBC 3.34 (L) 3.80 - 5.20 M/uL    HGB 10.3 (L) 11.5 - 16.0 g/dL    HCT 30.9 (L) 35.0 - 47.0 %    MCV 92.5 80.0 - 99.0 FL    MCH 30.8 26.0 - 34.0 PG    MCHC 33.3 30.0 - 36.5 g/dL    RDW 15.0 (H) 11.5 - 14.5 %    PLATELET 527 (H) 942 - 400 K/uL    MPV 10.0 8.9 - 12.9 FL    NRBC 0.0 0  WBC    ABSOLUTE NRBC 0.00 0.00 - 0.01 K/uL    NEUTROPHILS 78 (H) 32 - 75 %    LYMPHOCYTES 15 12 - 49 %    MONOCYTES 5 5 - 13 %    EOSINOPHILS 1 0 - 7 %    BASOPHILS 1 0 - 1 %    IMMATURE GRANULOCYTES 0 0.0 - 0.5 %    ABS. NEUTROPHILS 8.0 1.8 - 8.0 K/UL    ABS. LYMPHOCYTES 1.5 0.8 - 3.5 K/UL    ABS. MONOCYTES 0.5 0.0 - 1.0 K/UL    ABS. EOSINOPHILS 0.1 0.0 - 0.4 K/UL    ABS. BASOPHILS 0.1 0.0 - 0.1 K/UL    ABS. IMM. GRANS. 0.0 0.00 - 0.04 K/UL    DF AUTOMATED     SAMPLES BEING HELD    Collection Time: 19 10:49 AM   Result Value Ref Range    SAMPLES BEING HELD PINK      COMMENT        Add-on orders for these samples will be processed based on acceptable specimen integrity and analyte stability, which may vary by analyte.    TYPE & SCREEN    Collection Time: 19 10:49 AM   Result Value Ref Range    Crossmatch Expiration 2019     ABO/Rh(D) O POSITIVE     Antibody screen NEG    METABOLIC PANEL, COMPREHENSIVE Collection Time: 02/20/19 10:49 AM   Result Value Ref Range    Sodium 136 136 - 145 mmol/L    Potassium 4.3 3.5 - 5.1 mmol/L    Chloride 105 97 - 108 mmol/L    CO2 23 21 - 32 mmol/L    Anion gap 8 5 - 15 mmol/L    Glucose 90 65 - 100 mg/dL    BUN 10 6 - 20 MG/DL    Creatinine 0.74 0.55 - 1.02 MG/DL    BUN/Creatinine ratio 14 12 - 20      GFR est AA >60 >60 ml/min/1.73m2    GFR est non-AA >60 >60 ml/min/1.73m2    Calcium 8.2 (L) 8.5 - 10.1 MG/DL    Bilirubin, total 0.3 0.2 - 1.0 MG/DL    ALT (SGPT) 22 12 - 78 U/L    AST (SGOT) 32 15 - 37 U/L    Alk. phosphatase 49 45 - 117 U/L    Protein, total 6.8 6.4 - 8.2 g/dL    Albumin 3.4 (L) 3.5 - 5.0 g/dL    Globulin 3.4 2.0 - 4.0 g/dL    A-G Ratio 1.0 (L) 1.1 - 2.2         Imaging: office US as above    Assessment:     Active Problems:    * No active hospital problems. *      Plan:     I reviewed with Romero Stiles her medical records, physical exam, and review of symptoms. 36 yo with hypothyroidism and DUB. Due to stable Hgb 10.8 --> 10.3 discussed outpatient pill taper. After much discussion, pt and her  agreed to this with planned office f/u. There were no identifiable contraindications to combined pill taper. Loestrin 1.5/30 3 x 3 days, 2 x 2 days and 1 to complete the pack called in to the number provided by the patient with one refill. Patient may opt to skip the placebo week of the pill taper and go straight into another pack.     Signed By: Dilia Issa MD     February 20, 2019

## 2019-02-21 LAB — T3FREE SERPL-MCNC: 2.3 PG/ML (ref 2.2–4)

## 2019-02-22 LAB — T3 SERPL-MCNC: 80 NG/DL (ref 71–180)

## 2019-03-20 RX ORDER — BISMUTH SUBSALICYLATE 262 MG
1 TABLET,CHEWABLE ORAL DAILY
COMMUNITY

## 2019-03-20 NOTE — PERIOP NOTES
[facility] Ambulatory Surgery Unit Pre-operative Instructions Surgery/Procedure Date 3/26/19          Tentative Arrival Time 1000 1. On the day of your surgery/procedure, please report to the Ambulatory Surgery Unit Registration Desk and sign in at your designated time. The Ambulatory Surgery Unit is located in [facility] on the [facility] side of the [facility] across from the [address] [place] [address]. Please have all of your health insurance cards and a photo ID. 2. You must have someone with you to drive you home, as you should not drive a car for 24 hours following anesthesia. Please make arrangements for a responsible adult friend or family member to stay with you for at least the first 24 hours after your surgery. 3. Do not have anything to eat or drink (including water, gum, mints, coffee, juice) after 11:59 PM  3/25/19. This may not apply to medications prescribed by your physician. (Please note below the special instructions with medications to take the morning of surgery, if applicable.) 4. We recommend you do not drink any alcoholic beverages for 24 hours before and after your surgery. 5. Contact your surgeons office for instructions on the following medications: non-steroidal anti-inflammatory drugs (i.e. Advil, Aleve), vitamins, and supplements. (Some surgeons will want you to stop these medications prior to surgery and others may allow you to take them) **If you are currently taking Plavix, Coumadin, Aspirin and/or other blood-thinning agents, contact your surgeon for instructions. ** Your surgeon will partner with the physician prescribing these medications to determine if it is safe to stop or if you need to continue taking. Please do not stop taking these medications without instructions from your surgeon.  
 
6. In an effort to help prevent surgical site infection, we ask that you shower with an anti-bacterial soap (i.e. Dial/Safeguard, or the soap provided to you at your preadmission testing appointment) for 3 days prior to and on the morning of surgery, using a fresh towel after each shower. (Please begin this process with fresh bed linens.) Do not apply any lotions, powders, or deodorants after the shower on the day of your procedure. If applicable, please do not shave the operative site for 48 hours prior to surgery. 7. Wear comfortable clothes. Wear glasses instead of contacts. Do not bring any jewelry or money (other than copays or fees as instructed). Do not wear make-up, particularly mascara, the morning of your surgery. Do not wear nail polish, particularly if you are having foot /hand surgery. Wear your hair loose or down, no ponytails, buns, luz pins or clips. All body piercings must be removed. 8. You should understand that if you do not follow these instructions your surgery may be cancelled. If your physical condition changes (i.e. fever, cold or flu) please contact your surgeon as soon as possible. 9. It is important that you be on time. If a situation occurs where you may be late, or if you have any questions or problems, please call [phone]. 
 
10. Your surgery time may be subject to change. You will receive a phone call the day prior to surgery to confirm your arrival time. 11. Pediatric patients: please bring a change of clothes, diapers, bottle/sippy cup, pacifier, etc. 
 
 
Special Instructions: Take all medications and inhalers, as prescribed, on the morning of surgery with a sip of water EXCEPT: none I understand a pre-operative phone call will be made to verify my surgery time. In the event that I am not available, I give permission for a message to be left on my answering service and/or with another person? YES The above note was reviewed with patient during PAT phone call on 3/20/19, patient verbalized understanding. ___________________      ___________________      ________________ 
(Signature of Patient)          (Witness)                   (Date and Time)

## 2019-03-25 ENCOUNTER — ANESTHESIA EVENT (OUTPATIENT)
Dept: SURGERY | Age: 44
End: 2019-03-25
Payer: COMMERCIAL

## 2019-03-25 NOTE — ANESTHESIA PREPROCEDURE EVALUATION
Relevant Problems No relevant active problems Anesthetic History PONV Review of Systems / Medical History Patient summary reviewed and nursing notes reviewed Pulmonary Within defined limits Neuro/Psych Within defined limits Comments: Anxiety Cardiovascular Hyperlipidemia Exercise tolerance: >4 METS Comments: Palpitations Recent ECHO normal per patient GI/Hepatic/Renal 
Within defined limits Endo/Other Hypothyroidism: well controlled Anemia Other Findings Comments: Dysfunctional Uterine Bleeding Pectus Excavatum Physical Exam 
 
Airway Mallampati: I 
TM Distance: > 6 cm Neck ROM: normal range of motion Mouth opening: Normal 
 
 Cardiovascular Rhythm: regular Rate: abnormal 
 
 
 
Comments: Tachycardic Dental 
 
Dentition: Lower dentition intact and Upper dentition intact Pulmonary Breath sounds clear to auscultation Abdominal 
GI exam deferred Other Findings Anesthetic Plan ASA: 2 Anesthesia type: general 
 
Monitoring Plan: BIS Induction: Intravenous Anesthetic plan and risks discussed with: Patient

## 2019-03-26 ENCOUNTER — HOSPITAL ENCOUNTER (OUTPATIENT)
Age: 44
Setting detail: OUTPATIENT SURGERY
Discharge: HOME | End: 2019-03-26
Attending: OBSTETRICS & GYNECOLOGY | Admitting: OBSTETRICS & GYNECOLOGY
Payer: COMMERCIAL

## 2019-03-26 ENCOUNTER — ANESTHESIA (OUTPATIENT)
Dept: SURGERY | Age: 44
End: 2019-03-26
Payer: COMMERCIAL

## 2019-03-26 VITALS
HEIGHT: 63 IN | HEART RATE: 91 BPM | SYSTOLIC BLOOD PRESSURE: 156 MMHG | OXYGEN SATURATION: 100 % | TEMPERATURE: 98.8 F | DIASTOLIC BLOOD PRESSURE: 99 MMHG | RESPIRATION RATE: 16 BRPM | BODY MASS INDEX: 23.21 KG/M2 | WEIGHT: 131 LBS

## 2019-03-26 LAB — HCG UR QL: NEGATIVE

## 2019-03-26 PROCEDURE — 76030000000 HC AMB SURG OR TIME 0.5 TO 1: Performed by: OBSTETRICS & GYNECOLOGY

## 2019-03-26 PROCEDURE — 76210000040 HC AMBSU PH I REC FIRST 0.5 HR: Performed by: OBSTETRICS & GYNECOLOGY

## 2019-03-26 PROCEDURE — 77030033136 HC TBNG INFLO AQUILEX ST HOLO -C: Performed by: OBSTETRICS & GYNECOLOGY

## 2019-03-26 PROCEDURE — 81025 URINE PREGNANCY TEST: CPT

## 2019-03-26 PROCEDURE — 76060000061 HC AMB SURG ANES 0.5 TO 1 HR: Performed by: OBSTETRICS & GYNECOLOGY

## 2019-03-26 PROCEDURE — 77030033137 HC TBNG OUTFLO AQUILEX ST HOLO -B: Performed by: OBSTETRICS & GYNECOLOGY

## 2019-03-26 PROCEDURE — 76210000050 HC AMBSU PH II REC 0.5 TO 1 HR: Performed by: OBSTETRICS & GYNECOLOGY

## 2019-03-26 PROCEDURE — 77030020255 HC SOL INJ LR 1000ML BG: Performed by: OBSTETRICS & GYNECOLOGY

## 2019-03-26 PROCEDURE — 88305 TISSUE EXAM BY PATHOLOGIST: CPT

## 2019-03-26 PROCEDURE — 77030003666 HC NDL SPINAL BD -A: Performed by: OBSTETRICS & GYNECOLOGY

## 2019-03-26 PROCEDURE — 74011250636 HC RX REV CODE- 250/636

## 2019-03-26 PROCEDURE — 74011250636 HC RX REV CODE- 250/636: Performed by: OBSTETRICS & GYNECOLOGY

## 2019-03-26 PROCEDURE — 77030018836 HC SOL IRR NACL ICUM -A: Performed by: OBSTETRICS & GYNECOLOGY

## 2019-03-26 PROCEDURE — 77030033650 HC DEV TISS RMVL MYOSUR HOLO -F: Performed by: OBSTETRICS & GYNECOLOGY

## 2019-03-26 RX ORDER — ONDANSETRON 2 MG/ML
INJECTION INTRAMUSCULAR; INTRAVENOUS AS NEEDED
Status: DISCONTINUED | OUTPATIENT
Start: 2019-03-26 | End: 2019-03-26 | Stop reason: HOSPADM

## 2019-03-26 RX ORDER — SODIUM CHLORIDE, SODIUM LACTATE, POTASSIUM CHLORIDE, CALCIUM CHLORIDE 600; 310; 30; 20 MG/100ML; MG/100ML; MG/100ML; MG/100ML
25 INJECTION, SOLUTION INTRAVENOUS CONTINUOUS
Status: DISCONTINUED | OUTPATIENT
Start: 2019-03-26 | End: 2019-03-26 | Stop reason: HOSPADM

## 2019-03-26 RX ORDER — LIDOCAINE HYDROCHLORIDE 10 MG/ML
0.1 INJECTION, SOLUTION EPIDURAL; INFILTRATION; INTRACAUDAL; PERINEURAL AS NEEDED
Status: DISCONTINUED | OUTPATIENT
Start: 2019-03-26 | End: 2019-03-26 | Stop reason: HOSPADM

## 2019-03-26 RX ORDER — FENTANYL CITRATE 50 UG/ML
INJECTION, SOLUTION INTRAMUSCULAR; INTRAVENOUS AS NEEDED
Status: DISCONTINUED | OUTPATIENT
Start: 2019-03-26 | End: 2019-03-26 | Stop reason: HOSPADM

## 2019-03-26 RX ORDER — SODIUM CHLORIDE 0.9 % (FLUSH) 0.9 %
5-40 SYRINGE (ML) INJECTION AS NEEDED
Status: DISCONTINUED | OUTPATIENT
Start: 2019-03-26 | End: 2019-03-26 | Stop reason: HOSPADM

## 2019-03-26 RX ORDER — IBUPROFEN 600 MG/1
600 TABLET ORAL
Qty: 30 TAB | Refills: 0 | Status: SHIPPED | OUTPATIENT
Start: 2019-03-26 | End: 2020-03-20

## 2019-03-26 RX ORDER — ACETAMINOPHEN 10 MG/ML
1000 INJECTION, SOLUTION INTRAVENOUS ONCE
Status: COMPLETED | OUTPATIENT
Start: 2019-03-26 | End: 2019-03-26

## 2019-03-26 RX ORDER — FENTANYL CITRATE 50 UG/ML
25 INJECTION, SOLUTION INTRAMUSCULAR; INTRAVENOUS
Status: DISCONTINUED | OUTPATIENT
Start: 2019-03-26 | End: 2019-03-26 | Stop reason: HOSPADM

## 2019-03-26 RX ORDER — MIDAZOLAM HYDROCHLORIDE 1 MG/ML
INJECTION, SOLUTION INTRAMUSCULAR; INTRAVENOUS AS NEEDED
Status: DISCONTINUED | OUTPATIENT
Start: 2019-03-26 | End: 2019-03-26 | Stop reason: HOSPADM

## 2019-03-26 RX ORDER — PROPOFOL 10 MG/ML
INJECTION, EMULSION INTRAVENOUS
Status: DISCONTINUED | OUTPATIENT
Start: 2019-03-26 | End: 2019-03-26 | Stop reason: HOSPADM

## 2019-03-26 RX ORDER — LIDOCAINE HYDROCHLORIDE 10 MG/ML
10 INJECTION, SOLUTION EPIDURAL; INFILTRATION; INTRACAUDAL; PERINEURAL ONCE
Status: DISCONTINUED | OUTPATIENT
Start: 2019-03-26 | End: 2019-03-26 | Stop reason: HOSPADM

## 2019-03-26 RX ORDER — DIPHENHYDRAMINE HYDROCHLORIDE 50 MG/ML
12.5 INJECTION, SOLUTION INTRAMUSCULAR; INTRAVENOUS AS NEEDED
Status: DISCONTINUED | OUTPATIENT
Start: 2019-03-26 | End: 2019-03-26 | Stop reason: HOSPADM

## 2019-03-26 RX ORDER — PROPOFOL 10 MG/ML
INJECTION, EMULSION INTRAVENOUS AS NEEDED
Status: DISCONTINUED | OUTPATIENT
Start: 2019-03-26 | End: 2019-03-26 | Stop reason: HOSPADM

## 2019-03-26 RX ORDER — HYDROMORPHONE HYDROCHLORIDE 1 MG/ML
0.2 INJECTION, SOLUTION INTRAMUSCULAR; INTRAVENOUS; SUBCUTANEOUS
Status: DISCONTINUED | OUTPATIENT
Start: 2019-03-26 | End: 2019-03-26 | Stop reason: HOSPADM

## 2019-03-26 RX ORDER — KETOROLAC TROMETHAMINE 30 MG/ML
INJECTION, SOLUTION INTRAMUSCULAR; INTRAVENOUS AS NEEDED
Status: DISCONTINUED | OUTPATIENT
Start: 2019-03-26 | End: 2019-03-26 | Stop reason: HOSPADM

## 2019-03-26 RX ORDER — ACETAMINOPHEN 10 MG/ML
INJECTION, SOLUTION INTRAVENOUS
Status: COMPLETED
Start: 2019-03-26 | End: 2019-03-26

## 2019-03-26 RX ORDER — SODIUM CHLORIDE 0.9 % (FLUSH) 0.9 %
5-40 SYRINGE (ML) INJECTION EVERY 8 HOURS
Status: DISCONTINUED | OUTPATIENT
Start: 2019-03-26 | End: 2019-03-26 | Stop reason: HOSPADM

## 2019-03-26 RX ORDER — DEXAMETHASONE SODIUM PHOSPHATE 4 MG/ML
INJECTION, SOLUTION INTRA-ARTICULAR; INTRALESIONAL; INTRAMUSCULAR; INTRAVENOUS; SOFT TISSUE AS NEEDED
Status: DISCONTINUED | OUTPATIENT
Start: 2019-03-26 | End: 2019-03-26 | Stop reason: HOSPADM

## 2019-03-26 RX ADMIN — ACETAMINOPHEN 1000 MG: 10 INJECTION, SOLUTION INTRAVENOUS at 10:39

## 2019-03-26 RX ADMIN — FENTANYL CITRATE 100 MCG: 50 INJECTION, SOLUTION INTRAMUSCULAR; INTRAVENOUS at 11:15

## 2019-03-26 RX ADMIN — MIDAZOLAM HYDROCHLORIDE 2 MG: 1 INJECTION, SOLUTION INTRAMUSCULAR; INTRAVENOUS at 11:11

## 2019-03-26 RX ADMIN — PROPOFOL 50 MCG/KG/MIN: 10 INJECTION, EMULSION INTRAVENOUS at 11:17

## 2019-03-26 RX ADMIN — DEXAMETHASONE SODIUM PHOSPHATE 8 MG: 4 INJECTION, SOLUTION INTRA-ARTICULAR; INTRALESIONAL; INTRAMUSCULAR; INTRAVENOUS; SOFT TISSUE at 11:21

## 2019-03-26 RX ADMIN — SODIUM CHLORIDE, POTASSIUM CHLORIDE, SODIUM LACTATE AND CALCIUM CHLORIDE: 600; 310; 30; 20 INJECTION, SOLUTION INTRAVENOUS at 11:11

## 2019-03-26 RX ADMIN — PROPOFOL 30 MG: 10 INJECTION, EMULSION INTRAVENOUS at 11:17

## 2019-03-26 RX ADMIN — PROPOFOL 50 MG: 10 INJECTION, EMULSION INTRAVENOUS at 11:21

## 2019-03-26 RX ADMIN — KETOROLAC TROMETHAMINE 30 MG: 30 INJECTION, SOLUTION INTRAMUSCULAR; INTRAVENOUS at 11:33

## 2019-03-26 RX ADMIN — ONDANSETRON 4 MG: 2 INJECTION INTRAMUSCULAR; INTRAVENOUS at 11:21

## 2019-03-26 NOTE — PERIOP NOTES
Patient: Bob Ventura MRN: [id number]  SSN: [id number] YOB: 1975  Age: 37 y.o. Sex: female Patient is status post Procedure(s): HYSTEROSCOPY, MYOSURE, AND DILATATION AND CURETTAGE. Surgeon(s) and Role: Natan Morillo MD - Primary Local/Dose/Irrigation:  SE MAR Peripheral IV 03/26/19 Left Antecubital (Active) Site Assessment Clean, dry, & intact 3/26/2019 10:26 AM  
Phlebitis Assessment 0 3/26/2019 10:26 AM  
Infiltration Assessment 0 3/26/2019 10:26 AM  
Dressing Status New 3/26/2019 10:26 AM  
Dressing Type Transparent;Tape 3/26/2019 10:26 AM  
Hub Color/Line Status Pink; Infusing 3/26/2019 10:26 AM  
                 
 
 
 
Dressing/Packing:  Wound Vagina-Dressing Type : Kendra-pad (03/26/19 1100) Other:  PATIENT STRAIGHT CATHED AT BEGINNING OF PROCEDURE BY SURGEON, URINE OUTPUT 250 ML, PER SURGEON. FLUID DEFICIT 400 ML.

## 2019-03-26 NOTE — OP NOTES
HYSTEROSCOPY D & C POLYPECTOMY FULL OP NOTE        DATE OF PROCEDURE:  3/26/2019    PREOPERATIVE DIAGNOSIS:  DYSFUNCTIONAL UTERINE BLEEDING, ENDOMETRIAL POLYPS, THICKENED ENDOMETRIUM    POSTOPERATIVE DIAGNOSIS: SAME    PROCEDURE: Myosure hysteroscopy with endometrial sampling. dilatation & curettage    SURGEON:  Lynn Delaney MD    ASSISTANT:  none    ANESTHESIA: MAC. Paracervical block. EBL:  minimal    FINDINGS: A few very small polyp like structures along anterior uterine wall, otherwise normal appearing endometrial cavity with both tubal ostia visualized    Specimen:  Endometrial sampling and curettings    PROCEDURE: Patient was placed on the operating table in the supine position. Time out was done to confirm the operating procedure, surgeon, patient and site. Once confirmed by the team, procedure was started. MAC anesthesia was found to be adequate and she was prepped and draped in normal sterile fashion in dorsal lithotomy position. A sterile speculum was placed in the vagina and the anterior lip of the cervix grasped with a single-tooth tenaculum. A paracervical block was obtained by injecting 5 cc of 1% Lidocaine at 4 and 8 o'clock. The cervix was then easily dilated to 23-Ukrainian. The Myosure hysteroscope was then placed in the endometrial cavity using normal saline as distention media. The above findings were noted. The Reach Myosure device was placed through the outflow port and all surfaces of the endometrial cavity and the possible small polyps were sampled using product guidelines. The hysteroscopy was then removed. A sharp curettage was then performed with endometrial curettings being sent for histologic review, using a medium sharp curette. There was no bleeding. Instruments were removed. There was 200cc difference in I&Os of distention media. Sponge, lap and needle counts were correct x 2. The patient went to the recovery room in satisfactory condition.  She  will call with any increased pain, fever, or bleeding.

## 2019-03-26 NOTE — PERIOP NOTES
Negra Hallman Junes 1975 
[id number] Situation: 
Verbal report given from: FRANTZ Scott RN and MICHELA Lindo CRNA Procedure: Procedure(s): HYSTEROSCOPY, MYOSURE, AND DILATATION AND CURETTAGE Background: 
 
Preoperative diagnosis: DYSFUNCTIONAL UTERINE BLEEDING Postoperative diagnosis: DYSFUNCTIONAL UTERINE BLEEDING :  Dr. Madeline Brito Assistant(s): Circ-1: Ibis Lopez RN Scrub Tech-1: Phylicia Lares Specimens:  
ID Type Source Tests Collected by Time Destination 1 : ENDOMETRIAL CURETTINGS Preservative Endometrial Curetting  Scot Avelar MD 3/26/2019 1137 Pathology 2 : ENDOMETRIAL SAMPLINGS Preservative Endometrial  Scot Avelar MD 3/26/2019 1137 Pathology Assessment: 
Intra-procedure medications Anesthesia gave intra-procedure sedation and medications, see anesthesia flow sheet Intravenous fluids: Wilfrido Sotero Vital signs stable Recommendation: 
 
Permission to share finding with  [facility] : yes

## 2019-03-26 NOTE — PERIOP NOTES
Permission received to review discharge instructions and discuss private health information with Luz Khan, .

## 2019-03-26 NOTE — PERIOP NOTES
Received to pACU, drowsy but eaily aroused. States \"comfortable, tired\" 1200 States \"mild cramping 4/10, tolerable at this time\" Dozing off and on. 
1205  to bedside, pt awake, HOB elevated,pt sipping on ginger ale 1210 D/C instructions reviewed with pt/ and  signed for them. [id number] Discharged to home via/wc,accompanied to car per RN. Skin warm and dry, awake and alert. Respirations even, unlabored. Pt and family members questions and concerns addressed prior to discharge. All belongings with pt. Glasses with pt, purse with

## 2019-03-26 NOTE — DISCHARGE INSTRUCTIONS
>>>You received Toradol during your surgery. You may not take any form of NSAIDS (non steroidal anti inflammatory drugs) such as Advil, Ibuprofen, Aleve, Motrin until 5:30pm.<<<    >>>You received an IV form of Tylenol 1000mg (Ofirmev) during your surgery, you may take tylenol (or pain medication containing Tylenol or Acetaminophen) in 6 hours at 2:30pm.<<<        After Care Instructions For Your D&C      1. You may resume your usual diet once the nausea resolves. Initially, try sips of warm fluids and a bland diet. 2. Avoid heavy lifting and straining. Gradually increase your activity. First, try walking and doing light activity around the house. Resume your normal habits if no significant discomfort or bleeding develops. Most women can return to work within one to four days after this procedure. 3. You may take showers. Avoid using a tub bath, swimming pool or hot tub until after your check-up. 4. Do not place anything in your vagina until after your postoperative visit. Do not   douche, use tampons, or have intercourse because this may cause bleeding and   infection. 5. You may initially experience a heavy bloody discharge. This should not be more than your menstrual flow. Over the next several days, the flow should steadily decrease. 6. Typically following the procedure, there is little or no pain. You may feel cramps in your lower abdomen. Tylenol may relieve mild cramping. If pain medication does not improve your symptoms, you should contact your physician. 7. Contact the office if you have excessive bleeding (saturating a pad an hour for two hours or passing large clots). It is also necessary to speak with your physician if you develop chills, a temperature greater than 100.4, difficulty voiding or burning on urination. 8. Your physician may want to see you in the office after your D&C. Please call for an appointment if this has not already been arranged.  Our office phone number is [phone]. If appropriate, the microscopic results from your procedure will be discussed at this follow-up visit. DO NOT TAKE SLEEPING MEDICATIONS OR ANTIANXIETY MEDICATIONS WHILE TAKING NARCOTIC PAIN MEDICATIONS,  ESPECIALLY THE NIGHT OF ANESTHESIA! CPAP PATIENTS BE SURE TO WEAR MACHINE WHENEVER NAPPING OR SLEEPING! DISCHARGE SUMMARY from Nurse    The following personal items collected during your admission are returned to you:   Dental Appliance: Dental Appliances: None  Vision: Visual Aid: Glasses()  Hearing Aid:    Jewelry: Jewelry: None  Clothing: Clothing: With patient  Other Valuables: Other Valuables: Eyeglasses()  Valuables sent to safe:        PATIENT INSTRUCTIONS:    After General Anesthesia or Intravenous Sedation, for 24 hours or while taking prescription Narcotics:        Someone should be with you for the next 24 hours. For your own safety, a responsible adult must drive you home. · Limit your activities  · Recommended activity: Rest today, up with assistance today. Do not climb stairs or shower unattended for the next 24 hours. · Please start with a soft bland diet and advance as tolerated (no nausea) to regular diet. · If you have a sore throat you should try the following: fluids, warm salt water gargles, or throat lozenges. If it does not improve after several days please follow up with your primary physician. · Do not drive and operate hazardous machinery  · Do not make important personal or business decisions  · Do  not drink alcoholic beverages  · If you have not urinated within 8 hours after discharge, please contact your surgeon on call.     Report the following to your surgeon:  · Excessive pain, swelling, redness or odor of or around the surgical area  · Temperature over 100.5  · Nausea and vomiting lasting longer than 4 hours or if unable to take medications  · Any signs of decreased circulation or nerve impairment to extremity: change in color, persistent  numbness, tingling, coldness or increase pain      · You will receive a Post Operative Call from one of the Recovery Room Nurses on the day after your surgery to check on you. It is very important for us to know how you are recovering after your surgery. If you have an issue or need to speak with someone, please call your surgeon, do not wait for the post operative call. · You may receive an e-mail or letter in the mail from [facility] regarding your experience with us in the Ambulatory Surgery Unit. Your feedback is valuable to us and we appreciate your participation in the survey. · If the above instructions are not adequate or you are having problems after your surgery, call the physician at their office number. · We wish you a speedy recovery ? What to do at Home:      *  Please give a list of your current medications to your Primary Care Provider. *  Please update this list whenever your medications are discontinued, doses are      changed, or new medications (including over-the-counter products) are added. *  Please carry medication information at all times in case of emergency situations. These are general instructions for a healthy lifestyle:    No smoking/ No tobacco products/ Avoid exposure to second hand smoke    Surgeon General's Warning:  Quitting smoking now greatly reduces serious risk to your health. Obesity, smoking, and sedentary lifestyle greatly increases your risk for illness    A healthy diet, regular physical exercise & weight monitoring are important for maintaining a healthy lifestyle    You may be retaining fluid if you have a history of heart failure or if you experience any of the following symptoms:  Weight gain of 3 pounds or more overnight or 5 pounds in a week, increased swelling in our hands or feet or shortness of breath while lying flat in bed.   Please call your doctor as soon as you notice any of these symptoms; do not wait until your next office visit. Recognize signs and symptoms of STROKE:    B - Balance  E - Eyes    F-  Face looks uneven  A-  Arms unable to move or move even  S-  Speech slurred or non-existent  T-  Time-call 911 as soon as signs and symptoms begin-DO NOT go       Back to bed or wait to see if you get better-TIME IS BRAIN. If you have not received your influenza and/or pneumococcal vaccine, please follow up with your primary care physician. The discharge information has been reviewed with the patient and caregiver. The patient and caregiver verbalized understanding.

## 2023-05-24 RX ORDER — LEVOTHYROXINE SODIUM 0.05 MG/1
50 TABLET ORAL
COMMUNITY
Start: 2014-08-19

## 2023-05-24 RX ORDER — CLONAZEPAM 1 MG/1
0.25 TABLET ORAL PRN
COMMUNITY

## 2023-05-24 RX ORDER — AMITRIPTYLINE HYDROCHLORIDE 50 MG/1
1 TABLET, FILM COATED ORAL NIGHTLY
COMMUNITY
Start: 2015-03-12

## (undated) DEVICE — SYR 10ML LUER LOK 1/5ML GRAD --

## (undated) DEVICE — D&C/GYN-LF: Brand: MEDLINE INDUSTRIES, INC.

## (undated) DEVICE — SET SEALS HYSTEROSCOPE DISP -- MYOSURE  EA=10

## (undated) DEVICE — LIGHT HANDLE: Brand: DEVON

## (undated) DEVICE — SPECIMEN SOCK - STANDARD: Brand: MEDI-VAC

## (undated) DEVICE — 3M™ TEGADERM™ TRANSPARENT FILM DRESSING FRAME STYLE, 1624W, 2-3/8 IN X 2-3/4 IN (6 CM X 7 CM), 100/CT 4CT/CASE: Brand: 3M™ TEGADERM™

## (undated) DEVICE — INFECTION CONTROL KIT SYS

## (undated) DEVICE — MEDI-VAC NON-CONDUCTIVE SUCTION TUBING: Brand: CARDINAL HEALTH

## (undated) DEVICE — SOLUTION LACTATED RINGERS INJECTION USP

## (undated) DEVICE — 4-PORT MANIFOLD: Brand: NEPTUNE 2

## (undated) DEVICE — TUBE ST FLD AQUILEX OUTFLO --

## (undated) DEVICE — PREP PAD BNS: Brand: CONVERTORS

## (undated) DEVICE — TUBE ST FLD CTRL AQUILEX INFLO --

## (undated) DEVICE — STERILE POLYISOPRENE POWDER-FREE SURGICAL GLOVES: Brand: PROTEXIS

## (undated) DEVICE — DEVICE TISS RETRV 3MMX25.25IN -- MYOSURE

## (undated) DEVICE — KENDALL DL ECG CABLE AND LEAD WIRE SYSTEM, 5-LEAD, SINGLE PATIENT USE: Brand: KENDALL

## (undated) DEVICE — CONTINU-FLO SOLUTION SET, 2 INJECTION SITES, MALE LUER LOCK ADAPTER WITH RETRACTABLE COLLAR, LARGE BORE STOPCOCK WITH ROTATING MALE LUER LOCK EXTENSION SET, 2 INJECTION SITES, MALE LUER LOCK ADAPTER WITH RETRACTABLE COLLAR: Brand: INTERLINK/CONTINU-FLO

## (undated) DEVICE — SOLUTION IRRIG 3000ML 0.9% SOD CHL FLX CONT 0797208] ICU MEDICAL INC]

## (undated) DEVICE — NEEDLE SPNL 22GA L3.5IN BLK HUB S STL REG WALL FIT STYL W/